# Patient Record
Sex: FEMALE | Race: WHITE | NOT HISPANIC OR LATINO | URBAN - METROPOLITAN AREA
[De-identification: names, ages, dates, MRNs, and addresses within clinical notes are randomized per-mention and may not be internally consistent; named-entity substitution may affect disease eponyms.]

---

## 2017-01-06 ENCOUNTER — IMPORTED ENCOUNTER (OUTPATIENT)
Dept: URBAN - METROPOLITAN AREA CLINIC 50 | Facility: CLINIC | Age: 77
End: 2017-01-06

## 2017-02-10 ENCOUNTER — IMPORTED ENCOUNTER (OUTPATIENT)
Dept: URBAN - METROPOLITAN AREA CLINIC 50 | Facility: CLINIC | Age: 77
End: 2017-02-10

## 2017-08-31 ENCOUNTER — IMPORTED ENCOUNTER (OUTPATIENT)
Dept: URBAN - METROPOLITAN AREA CLINIC 50 | Facility: CLINIC | Age: 77
End: 2017-08-31

## 2017-09-07 ENCOUNTER — IMPORTED ENCOUNTER (OUTPATIENT)
Dept: URBAN - METROPOLITAN AREA CLINIC 50 | Facility: CLINIC | Age: 77
End: 2017-09-07

## 2018-04-27 ENCOUNTER — IMPORTED ENCOUNTER (OUTPATIENT)
Dept: URBAN - METROPOLITAN AREA CLINIC 50 | Facility: CLINIC | Age: 78
End: 2018-04-27

## 2018-05-02 ENCOUNTER — IMPORTED ENCOUNTER (OUTPATIENT)
Dept: URBAN - METROPOLITAN AREA CLINIC 50 | Facility: CLINIC | Age: 78
End: 2018-05-02

## 2018-05-17 ENCOUNTER — IMPORTED ENCOUNTER (OUTPATIENT)
Dept: URBAN - METROPOLITAN AREA CLINIC 50 | Facility: CLINIC | Age: 78
End: 2018-05-17

## 2018-06-01 ENCOUNTER — IMPORTED ENCOUNTER (OUTPATIENT)
Dept: URBAN - METROPOLITAN AREA CLINIC 50 | Facility: CLINIC | Age: 78
End: 2018-06-01

## 2018-09-14 ENCOUNTER — IMPORTED ENCOUNTER (OUTPATIENT)
Dept: URBAN - METROPOLITAN AREA CLINIC 50 | Facility: CLINIC | Age: 78
End: 2018-09-14

## 2018-10-05 ENCOUNTER — IMPORTED ENCOUNTER (OUTPATIENT)
Dept: URBAN - METROPOLITAN AREA CLINIC 50 | Facility: CLINIC | Age: 78
End: 2018-10-05

## 2018-12-27 ENCOUNTER — IMPORTED ENCOUNTER (OUTPATIENT)
Dept: URBAN - METROPOLITAN AREA CLINIC 50 | Facility: CLINIC | Age: 78
End: 2018-12-27

## 2019-04-05 ENCOUNTER — IMPORTED ENCOUNTER (OUTPATIENT)
Dept: URBAN - METROPOLITAN AREA CLINIC 50 | Facility: CLINIC | Age: 79
End: 2019-04-05

## 2019-08-09 ENCOUNTER — IMPORTED ENCOUNTER (OUTPATIENT)
Dept: URBAN - METROPOLITAN AREA CLINIC 50 | Facility: CLINIC | Age: 79
End: 2019-08-09

## 2020-01-21 ENCOUNTER — IMPORTED ENCOUNTER (OUTPATIENT)
Dept: URBAN - METROPOLITAN AREA CLINIC 50 | Facility: CLINIC | Age: 80
End: 2020-01-21

## 2020-05-29 ENCOUNTER — IMPORTED ENCOUNTER (OUTPATIENT)
Dept: URBAN - METROPOLITAN AREA CLINIC 50 | Facility: CLINIC | Age: 80
End: 2020-05-29

## 2020-09-18 ENCOUNTER — IMPORTED ENCOUNTER (OUTPATIENT)
Dept: URBAN - METROPOLITAN AREA CLINIC 50 | Facility: CLINIC | Age: 80
End: 2020-09-18

## 2021-01-15 ENCOUNTER — IMPORTED ENCOUNTER (OUTPATIENT)
Dept: URBAN - METROPOLITAN AREA CLINIC 50 | Facility: CLINIC | Age: 81
End: 2021-01-15

## 2021-01-22 ENCOUNTER — IMPORTED ENCOUNTER (OUTPATIENT)
Dept: URBAN - METROPOLITAN AREA CLINIC 50 | Facility: CLINIC | Age: 81
End: 2021-01-22

## 2021-01-29 ENCOUNTER — IMPORTED ENCOUNTER (OUTPATIENT)
Dept: URBAN - METROPOLITAN AREA CLINIC 50 | Facility: CLINIC | Age: 81
End: 2021-01-29

## 2021-04-17 ASSESSMENT — VISUAL ACUITY
OD_CC: J7@ 16 IN
OS_BAT: 20/25
OD_SC: 20/20
OD_SC: 20/40-2
OS_SC: 20/25
OS_OTHER: 20/200. 20/400.
OS_SC: 20/30
OS_CC: J1+@ 16 IN
OD_SC: 20/20
OS_CC: J1
OS_BAT: 20/40
OS_CC: J7@ 16 IN
OD_CC: J1+@ 16 IN
OD_SC: 20/30
OD_SC: 20/25-
OS_SC: 20/50
OD_CC: J1
OS_SC: 20/25
OS_PH: 20/25
OD_OTHER: 20/30. 20/50.
OS_CC: 20/25-1
OD_BAT: 20/30
OD_SC: 20/25-2
OS_BAT: 20/60
OS_SC: 20/20
OD_OTHER: 20/200. 20/400.
OS_BAT: 20/200
OD_CC: J1+@ 16 IN
OS_CC: J1+@ 16 IN
OD_BAT: 20/25
OD_BAT: 20/70
OS_CC: 20/20
OD_CC: 20/20
OS_SC: 20/30
OS_PH: 20/40
OS_OTHER: 20/40. 20/50.
OD_BAT: 20/200
OD_SC: 20/25-2
OS_SC: 20/25+
OD_CC: 20/20
OD_OTHER: 20/25. 20/30.
OS_OTHER: 20/25. 20/30.
OD_PH: 20/30+
OS_OTHER: 20/60. 20/100.
OS_SC: 20/30-
OD_PH: 20/25+
OD_OTHER: 20/70. 20/100.
OD_SC: 20/30

## 2021-04-17 ASSESSMENT — TONOMETRY
OS_IOP_MMHG: 16
OD_IOP_MMHG: 20
OS_IOP_MMHG: 16
OD_IOP_MMHG: 17
OS_IOP_MMHG: 15
OS_IOP_MMHG: 15
OD_IOP_MMHG: 15
OS_IOP_MMHG: 16
OD_IOP_MMHG: 19
OS_IOP_MMHG: 17
OD_IOP_MMHG: 19
OS_IOP_MMHG: 26
OD_IOP_MMHG: 16
OS_IOP_MMHG: 16
OS_IOP_MMHG: 15
OS_IOP_MMHG: 18
OS_IOP_MMHG: 19
OS_IOP_MMHG: 20
OD_IOP_MMHG: 16
OD_IOP_MMHG: 16
OD_IOP_MMHG: 28
OS_IOP_MMHG: 16
OS_IOP_MMHG: 15
OS_IOP_MMHG: 16
OD_IOP_MMHG: 16
OS_IOP_MMHG: 17
OS_IOP_MMHG: 19

## 2021-04-17 ASSESSMENT — PACHYMETRY
OD_CT_UM: 545
OS_CT_UM: 557
OD_CT_UM: 545
OS_CT_UM: 557
OD_CT_UM: 545
OS_CT_UM: 557
OD_CT_UM: 545
OS_CT_UM: 557
OS_CT_UM: 557
OD_CT_UM: 545
OD_CT_UM: 545
OS_CT_UM: 557
OS_CT_UM: 557
OD_CT_UM: 545
OS_CT_UM: 557
OD_CT_UM: 545
OS_CT_UM: 557

## 2021-05-27 ENCOUNTER — PREPPED CHART (OUTPATIENT)
Dept: URBAN - METROPOLITAN AREA CLINIC 52 | Facility: CLINIC | Age: 81
End: 2021-05-27

## 2021-05-27 NOTE — PATIENT DISCUSSION
"""Reassured patient that intraocular pressures (IOPs) are at target levels and other ocular findings are stable. Continue present management and/or medication(s).  Follow up as directed. "" ""Continue Latanoprost both eyes at bedtime .""."

## 2021-05-28 ENCOUNTER — COMPREHENSIVE EXAM (OUTPATIENT)
Dept: URBAN - METROPOLITAN AREA CLINIC 52 | Facility: CLINIC | Age: 81
End: 2021-05-28

## 2021-05-28 DIAGNOSIS — H40.1111: ICD-10-CM

## 2021-05-28 DIAGNOSIS — H04.552: ICD-10-CM

## 2021-05-28 DIAGNOSIS — H35.62: ICD-10-CM

## 2021-05-28 DIAGNOSIS — H26.493: ICD-10-CM

## 2021-05-28 DIAGNOSIS — H40.012: ICD-10-CM

## 2021-05-28 PROCEDURE — 92134 CPTRZ OPH DX IMG PST SGM RTA: CPT

## 2021-05-28 PROCEDURE — 92014 COMPRE OPH EXAM EST PT 1/>: CPT

## 2021-05-28 ASSESSMENT — TONOMETRY
OS_IOP_MMHG: 15
OS_IOP_MMHG: 14
OD_IOP_MMHG: 16
OD_IOP_MMHG: 16

## 2021-05-28 ASSESSMENT — VISUAL ACUITY
OU_CC: J1+
OD_CC: 20/20
OS_CC: 20/20-1

## 2021-09-17 ENCOUNTER — FOLLOW UP (OUTPATIENT)
Dept: URBAN - METROPOLITAN AREA CLINIC 52 | Facility: CLINIC | Age: 81
End: 2021-09-17

## 2021-09-17 DIAGNOSIS — H40.012: ICD-10-CM

## 2021-09-17 DIAGNOSIS — H40.1111: ICD-10-CM

## 2021-09-17 DIAGNOSIS — H35.62: ICD-10-CM

## 2021-09-17 PROCEDURE — 92133 CPTRZD OPH DX IMG PST SGM ON: CPT

## 2021-09-17 PROCEDURE — 92014 COMPRE OPH EXAM EST PT 1/>: CPT

## 2021-09-17 PROCEDURE — 92083 EXTENDED VISUAL FIELD XM: CPT

## 2021-09-17 RX ORDER — TIMOLOL MALEATE 6.8 MG/ML
1 SOLUTION OPHTHALMIC TWICE A DAY
Start: 2021-09-17

## 2021-09-17 ASSESSMENT — VISUAL ACUITY
OD_GLARE: 20/40
OS_CC: 20/20-2
OD_GLARE: 20/25
OS_GLARE: 20/40
OU_CC: J1+ @ 16 INCHES
OD_CC: 20/20
OS_GLARE: 20/25

## 2021-09-17 ASSESSMENT — TONOMETRY
OS_IOP_MMHG: 24
OD_IOP_MMHG: 29

## 2021-09-17 NOTE — PATIENT DISCUSSION
Patient states going out of town soon for at least 6 weeks, asked for Dr. hurd instead of a referral. Information given for Dr. Vignesh Gómez.

## 2021-10-22 ENCOUNTER — FOLLOW UP (OUTPATIENT)
Dept: URBAN - METROPOLITAN AREA CLINIC 52 | Facility: CLINIC | Age: 81
End: 2021-10-22

## 2021-10-22 DIAGNOSIS — H40.012: ICD-10-CM

## 2021-10-22 DIAGNOSIS — H40.1111: ICD-10-CM

## 2021-10-22 PROCEDURE — 92012 INTRM OPH EXAM EST PATIENT: CPT

## 2021-10-22 ASSESSMENT — TONOMETRY
OD_IOP_MMHG: 18
OS_IOP_MMHG: 19
OD_IOP_MMHG: 18
OS_IOP_MMHG: 18

## 2021-10-22 ASSESSMENT — VISUAL ACUITY
OD_CC: 20/20
OS_CC: 20/20

## 2021-10-22 NOTE — PATIENT DISCUSSION
Patient states going out of town soon for at least 6 weeks, asked for Dr. hurd instead of a referral. Information given for Dr. Higinio Mathew.

## 2021-12-11 NOTE — PATIENT DISCUSSION
Continue Latanoprost OU Qhs.
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
PCO (NO TX): PCO is not visually significant. Educated patient on signs and symptoms of PCO. Continue to monitor at this time.
Patient denies hx of high BP or increased blood sugar. Will monitor closely.
Patient reports expression of milky fluid with warm compresses.
Patient states going out of town soon for at least 6 weeks, asked for Dr. hurd instead of a referral. Information given for Dr. Hayde Loza.
Patient would like to be referred for possible treatment.
Recommend F/U in 3 months.
Recommend HVF/ OCT on RTC.
The IOP is in the target range. The patient will continue the current management.
Unable to assess

## 2022-02-25 ENCOUNTER — FOLLOW UP (OUTPATIENT)
Dept: URBAN - METROPOLITAN AREA CLINIC 52 | Facility: CLINIC | Age: 82
End: 2022-02-25

## 2022-02-25 DIAGNOSIS — H04.552: ICD-10-CM

## 2022-02-25 DIAGNOSIS — H40.012: ICD-10-CM

## 2022-02-25 DIAGNOSIS — H40.1111: ICD-10-CM

## 2022-02-25 PROCEDURE — 92012 INTRM OPH EXAM EST PATIENT: CPT

## 2022-02-25 ASSESSMENT — TONOMETRY
OD_IOP_MMHG: 15
OS_IOP_MMHG: 15
OS_IOP_MMHG: 14

## 2022-02-25 ASSESSMENT — VISUAL ACUITY
OD_CC: 20/20
OS_CC: 20/20-2

## 2022-02-25 NOTE — PATIENT DISCUSSION
Instructed patient how to express the milky fluid with warm compresses and massage. Patient does not want to see Dr. Kadie Ash secondary to does not want surgical intervention.

## 2022-06-24 ENCOUNTER — ESTABLISHED PATIENT (OUTPATIENT)
Dept: URBAN - METROPOLITAN AREA CLINIC 52 | Facility: CLINIC | Age: 82
End: 2022-06-24

## 2022-06-24 DIAGNOSIS — H40.1121: ICD-10-CM

## 2022-06-24 DIAGNOSIS — H26.493: ICD-10-CM

## 2022-06-24 DIAGNOSIS — H40.011: ICD-10-CM

## 2022-06-24 DIAGNOSIS — H35.363: ICD-10-CM

## 2022-06-24 DIAGNOSIS — H35.372: ICD-10-CM

## 2022-06-24 PROCEDURE — 92134 CPTRZ OPH DX IMG PST SGM RTA: CPT

## 2022-06-24 PROCEDURE — 92014 COMPRE OPH EXAM EST PT 1/>: CPT

## 2022-06-24 ASSESSMENT — VISUAL ACUITY
OD_GLARE: 20/25
OS_CC: 20/20-1
OS_GLARE: 20/20-1
OS_GLARE: 20/25-1
OD_GLARE: 20/30-1
OD_CC: 20/20-2
OU_CC: 20/20
OU_CC: J1+@14IN

## 2022-06-24 ASSESSMENT — TONOMETRY
OS_IOP_MMHG: 16
OS_IOP_MMHG: 15
OD_IOP_MMHG: 15

## 2022-06-24 NOTE — PATIENT DISCUSSION
Instructed patient how to express the milky fluid with warm compresses and massage. Patient does not want to see Dr. Donna Valiente secondary to does not want surgical intervention.

## 2022-10-14 ENCOUNTER — FOLLOW UP (OUTPATIENT)
Dept: URBAN - METROPOLITAN AREA CLINIC 52 | Facility: CLINIC | Age: 82
End: 2022-10-14

## 2022-10-14 DIAGNOSIS — H40.1121: ICD-10-CM

## 2022-10-14 DIAGNOSIS — H40.011: ICD-10-CM

## 2022-10-14 PROCEDURE — 92012 INTRM OPH EXAM EST PATIENT: CPT

## 2022-10-14 PROCEDURE — 92083 EXTENDED VISUAL FIELD XM: CPT

## 2022-10-14 PROCEDURE — 92133 CPTRZD OPH DX IMG PST SGM ON: CPT

## 2022-10-14 ASSESSMENT — VISUAL ACUITY
OU_SC: 20/20
OS_SC: 20/25-1
OD_SC: 20/25-1

## 2022-10-14 ASSESSMENT — TONOMETRY
OS_IOP_MMHG: 14
OD_IOP_MMHG: 15
OS_IOP_MMHG: 15
OD_IOP_MMHG: 15

## 2022-10-14 NOTE — PATIENT DISCUSSION
The IOP is in the target range. The patient will continue the current management- Continue current management- Latanoprost OU qhs and Timolol OU bid.

## 2023-06-16 ENCOUNTER — COMPREHENSIVE EXAM (OUTPATIENT)
Dept: URBAN - METROPOLITAN AREA CLINIC 52 | Facility: CLINIC | Age: 83
End: 2023-06-16

## 2023-06-16 DIAGNOSIS — H26.493: ICD-10-CM

## 2023-06-16 DIAGNOSIS — H40.1121: ICD-10-CM

## 2023-06-16 DIAGNOSIS — H40.011: ICD-10-CM

## 2023-06-16 DIAGNOSIS — H35.363: ICD-10-CM

## 2023-06-16 PROCEDURE — 92015 DETERMINE REFRACTIVE STATE: CPT

## 2023-06-16 PROCEDURE — 92134 CPTRZ OPH DX IMG PST SGM RTA: CPT

## 2023-06-16 PROCEDURE — 92014 COMPRE OPH EXAM EST PT 1/>: CPT

## 2023-06-16 ASSESSMENT — VISUAL ACUITY
OU_SC: 20/25-1
OD_SC: 20/25
OU_SC: J1+@16"
OD_GLARE: 20/25
OS_GLARE: 20/30
OS_SC: 20/30
OD_GLARE: 20/25
OS_GLARE: 20/25
OS_PH: 20/30

## 2023-06-16 ASSESSMENT — TONOMETRY
OD_IOP_MMHG: 16
OD_IOP_MMHG: 16
OS_IOP_MMHG: 17
OS_IOP_MMHG: 16

## 2023-10-25 ENCOUNTER — FOLLOW UP (OUTPATIENT)
Dept: URBAN - METROPOLITAN AREA CLINIC 49 | Facility: LOCATION | Age: 83
End: 2023-10-25

## 2023-10-25 DIAGNOSIS — H40.011: ICD-10-CM

## 2023-10-25 DIAGNOSIS — H40.1121: ICD-10-CM

## 2023-10-25 DIAGNOSIS — H26.493: ICD-10-CM

## 2023-10-25 PROCEDURE — 92012 INTRM OPH EXAM EST PATIENT: CPT

## 2023-10-25 PROCEDURE — 92133 CPTRZD OPH DX IMG PST SGM ON: CPT

## 2023-10-25 PROCEDURE — 92083 EXTENDED VISUAL FIELD XM: CPT

## 2023-10-25 RX ORDER — DORZOLAMIDE HYDROCHLORIDE AND TIMOLOL MALEATE 20; 5 MG/ML; MG/ML
1 SOLUTION/ DROPS OPHTHALMIC TWICE A DAY
Start: 2023-10-25

## 2023-10-25 ASSESSMENT — TONOMETRY
OS_IOP_MMHG: 13
OD_IOP_MMHG: 15
OD_IOP_MMHG: 15
OS_IOP_MMHG: 14

## 2023-10-25 ASSESSMENT — VISUAL ACUITY
OD_SC: 20/25+2
OS_PH: 20/25
OS_SC: 20/30

## 2023-11-17 ENCOUNTER — FOLLOW UP (OUTPATIENT)
Dept: URBAN - METROPOLITAN AREA CLINIC 52 | Facility: CLINIC | Age: 83
End: 2023-11-17

## 2023-11-17 DIAGNOSIS — H04.552: ICD-10-CM

## 2023-11-17 DIAGNOSIS — H40.011: ICD-10-CM

## 2023-11-17 DIAGNOSIS — H40.1121: ICD-10-CM

## 2023-11-17 DIAGNOSIS — H26.492: ICD-10-CM

## 2023-11-17 PROCEDURE — 92012 INTRM OPH EXAM EST PATIENT: CPT

## 2023-11-17 ASSESSMENT — VISUAL ACUITY
OD_SC: 20/20
OS_PH: 20/25
OS_SC: 20/30-1

## 2023-11-17 ASSESSMENT — TONOMETRY
OD_IOP_MMHG: 14
OS_IOP_MMHG: 13
OS_IOP_MMHG: 14

## 2024-03-22 ENCOUNTER — FOLLOW UP (OUTPATIENT)
Dept: URBAN - METROPOLITAN AREA CLINIC 52 | Facility: CLINIC | Age: 84
End: 2024-03-22

## 2024-03-22 DIAGNOSIS — H40.012: ICD-10-CM

## 2024-03-22 DIAGNOSIS — H40.1111: ICD-10-CM

## 2024-03-22 PROCEDURE — 99214 OFFICE O/P EST MOD 30 MIN: CPT

## 2024-03-22 PROCEDURE — 92083 EXTENDED VISUAL FIELD XM: CPT

## 2024-03-22 PROCEDURE — 92133 CPTRZD OPH DX IMG PST SGM ON: CPT

## 2024-03-22 ASSESSMENT — TONOMETRY
OD_IOP_MMHG: 15
OS_IOP_MMHG: 15
OD_IOP_MMHG: 15
OS_IOP_MMHG: 14

## 2024-03-22 ASSESSMENT — VISUAL ACUITY
OD_SC: 20/20
OS_SC: 20/25-1
OU_SC: 20/20

## 2024-04-10 NOTE — PATIENT DISCUSSION
Discussed with patient that glaucoma is a lifelong disease and that lack of compliance with drops and follow up visits will lead to permanent and irreversible blindness. Patient expressed understanding. has hearing aids

## 2024-08-29 ENCOUNTER — COMPREHENSIVE EXAM (OUTPATIENT)
Dept: URBAN - METROPOLITAN AREA CLINIC 52 | Facility: CLINIC | Age: 84
End: 2024-08-29

## 2024-08-29 DIAGNOSIS — H40.1131: ICD-10-CM

## 2024-08-29 DIAGNOSIS — H40.012: ICD-10-CM

## 2024-08-29 DIAGNOSIS — H26.492: ICD-10-CM

## 2024-08-29 DIAGNOSIS — H35.363: ICD-10-CM

## 2024-08-29 PROCEDURE — 66821 AFTER CATARACT LASER SURGERY: CPT

## 2024-08-29 PROCEDURE — 92134 CPTRZ OPH DX IMG PST SGM RTA: CPT

## 2024-08-29 PROCEDURE — 92015 DETERMINE REFRACTIVE STATE: CPT

## 2024-08-29 PROCEDURE — 99214 OFFICE O/P EST MOD 30 MIN: CPT | Mod: 57,LT

## 2024-08-29 RX ORDER — PREDNISOLONE ACETATE 10 MG/ML: 1 SUSPENSION/ DROPS OPHTHALMIC ONCE A DAY

## 2024-08-29 ASSESSMENT — TONOMETRY
OD_IOP_MMHG: 14
OS_IOP_MMHG: 14
OD_IOP_MMHG: 14
OS_IOP_MMHG: 13

## 2024-08-29 ASSESSMENT — VISUAL ACUITY
OD_GLARE: 20/40
OS_SC: 20/25
OS_GLARE: 20/40
OS_GLARE: 20/50
OD_GLARE: 20/50
OU_SC: 20/25
OD_SC: 20/30

## 2024-09-23 ENCOUNTER — CLINIC PROCEDURE ONLY (OUTPATIENT)
Dept: URBAN - METROPOLITAN AREA CLINIC 53 | Facility: CLINIC | Age: 84
End: 2024-09-23

## 2024-09-23 DIAGNOSIS — H26.491: ICD-10-CM

## 2024-09-23 PROCEDURE — 66821 AFTER CATARACT LASER SURGERY: CPT

## 2024-10-31 ENCOUNTER — POST-OP (OUTPATIENT)
Dept: URBAN - METROPOLITAN AREA CLINIC 52 | Facility: CLINIC | Age: 84
End: 2024-10-31

## 2024-10-31 DIAGNOSIS — Z98.890: ICD-10-CM

## 2024-10-31 DIAGNOSIS — H40.012: ICD-10-CM

## 2024-10-31 DIAGNOSIS — H04.552: ICD-10-CM

## 2024-10-31 DIAGNOSIS — H40.1131: ICD-10-CM

## 2024-10-31 DIAGNOSIS — H35.363: ICD-10-CM

## 2025-02-13 ENCOUNTER — FOLLOW UP (OUTPATIENT)
Age: 85
End: 2025-02-13

## 2025-02-13 DIAGNOSIS — H40.012: ICD-10-CM

## 2025-02-13 DIAGNOSIS — H40.1131: ICD-10-CM

## 2025-02-13 PROCEDURE — 92012 INTRM OPH EXAM EST PATIENT: CPT

## 2025-07-02 ENCOUNTER — FOLLOW UP (OUTPATIENT)
Age: 85
End: 2025-07-02

## 2025-07-02 DIAGNOSIS — H40.1131: ICD-10-CM

## 2025-07-02 PROCEDURE — 99213 OFFICE O/P EST LOW 20 MIN: CPT

## 2025-07-30 ENCOUNTER — DIAGNOSTICS ONLY (OUTPATIENT)
Age: 85
End: 2025-07-30

## 2025-07-30 DIAGNOSIS — H40.1131: ICD-10-CM

## 2025-07-30 PROCEDURE — 92083 EXTENDED VISUAL FIELD XM: CPT

## 2025-07-30 PROCEDURE — 92133 CPTRZD OPH DX IMG PST SGM ON: CPT

## 2025-08-11 ENCOUNTER — TECH ONLY (OUTPATIENT)
Age: 85
End: 2025-08-11

## 2025-08-11 DIAGNOSIS — H52.4: ICD-10-CM

## 2025-08-11 PROCEDURE — 92015 DETERMINE REFRACTIVE STATE: CPT
